# Patient Record
(demographics unavailable — no encounter records)

---

## 2024-12-16 NOTE — WORK
[Torn Ligament/Tendon/Muscle] : torn ligament, tendon or muscle [Was the competent medical cause of the injury] : was the competent medical cause of the injury [Are consistent with the injury] : are consistent with the injury [Consistent with my objective findings] : consistent with my objective findings [Severe Partial] : severe partial [Cannot return to work because ________] : cannot return to work because [unfilled] [Lifting] : lifting [15+ days] : 15+ days [Patient] : patient [I provided the services listed above] :  I provided the services listed above. [No Rx restrictions] : No Rx restrictions. [FreeTextEntry1] : fair
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
## 2024-12-16 NOTE — DATA REVIEWED
[FreeTextEntry1] : MRI left shoulder [facility] 11/1/23 Large u shaped tearing of the supraspinatus and infraspinatus with 4 cm of retraction
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
## 2024-12-16 NOTE — PHYSICAL EXAM
[de-identified] : Constitutional: The general appearance of the patient is well developed, well nourished, no deformities and well groomed. Normal   Gait: Gait and function is as follows: normal gait.   Skin: Head and neck visualized skin is normal. Left upper extremity visualized skin is normal. Right upper extremity visualized skin is normal. Thoracic Skin of the thoracic spine shows visualized skin is normal.   Cardiovascular: palpable radial pulse bilaterally, good capillary refill in digits of the bilateral upper extremities and no temperature or color changes in the bilateral upper extremities.   Lymphatic: Normal Palpation of lymph nodes in the cervical.   Neurologic: fine motor control in the bilateral upper extremities is intact. Deep Tendon Reflexes in Upper and Lower Extremities Negative Gauthier's in the bilateral upper extremities. The patient is oriented to time, place and person. Sensation to light touch intact in the bilateral upper extremities. Mood and Affect is normal.   Right Shoulder: Inspection of the shoulder/upper arm is as follows: There is a full, pain-free, stable range of motion of the shoulder with normal strength and no tenderness to palpation.      Left Shoulder: Inspection of the shoulder/upper arm is as follows: Stable shoulder. Palpation of the shoulder/upper arm is as follows: There is tenderness at the AC joint, proximal biceps tendon and supraspinatus tendon. Range of motion of the shoulder is as follows: Pain with internal rotation, external rotation, abduction and forward flexion. Strength of the shoulder is as follows: Supraspinatus 4/5. External Rotation 4/5. Internal Rotation 4/5. Infraspinatus 5/5 4/5. Deltoid 5/5 Ligament Stability and Special Tests of the shoulder is as follows: Neer test is positive. Barbosa' test is positive. Speed's test is positive.  Neck: Inspection / Palpation of the cervical spine is as follows: There is a mild restricted range of motion of the cervical spine. Increase tone and mild tenderness to palpation. Stable ROM of cervical spine.   Back, including spine: Inspection / Palpation of the thoracic/lumbar spine is as follows: There is a full, pain free, stable range of motion of the thoracic spine with a normal tone and not tenderness to palpation..
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
## 2025-02-10 NOTE — WORK
[Torn Ligament/Tendon/Muscle] : torn ligament, tendon or muscle [Was the competent medical cause of the injury] : was the competent medical cause of the injury [Are consistent with the injury] : are consistent with the injury [Consistent with my objective findings] : consistent with my objective findings [Severe Partial] : severe partial [Cannot return to work because ________] : cannot return to work because [unfilled] [Lifting] : lifting [15+ days] : 15+ days [Patient] : patient [No Rx restrictions] : No Rx restrictions. [I provided the services listed above] :  I provided the services listed above. [FreeTextEntry1] : fair

## 2025-02-10 NOTE — PHYSICAL EXAM
[de-identified] : Constitutional: The general appearance of the patient is well developed, well nourished, no deformities and well groomed. Normal   Gait: Gait and function is as follows: normal gait.   Skin: Head and neck visualized skin is normal. Left upper extremity visualized skin is normal. Right upper extremity visualized skin is normal. Thoracic Skin of the thoracic spine shows visualized skin is normal.   Cardiovascular: palpable radial pulse bilaterally, good capillary refill in digits of the bilateral upper extremities and no temperature or color changes in the bilateral upper extremities.   Lymphatic: Normal Palpation of lymph nodes in the cervical.   Neurologic: fine motor control in the bilateral upper extremities is intact. Deep Tendon Reflexes in Upper and Lower Extremities Negative Gauthier's in the bilateral upper extremities. The patient is oriented to time, place and person. Sensation to light touch intact in the bilateral upper extremities. Mood and Affect is normal.   Right Shoulder: Inspection of the shoulder/upper arm is as follows: There is a full, pain-free, stable range of motion of the shoulder with normal strength and no tenderness to palpation.      Left Shoulder: Inspection of the shoulder/upper arm is as follows: Stable shoulder. Palpation of the shoulder/upper arm is as follows: There is tenderness at the AC joint, proximal biceps tendon and supraspinatus tendon. Range of motion of the shoulder is as follows: Pain with internal rotation, external rotation, abduction and forward flexion. Strength of the shoulder is as follows: Supraspinatus 4/5. External Rotation 4/5. Internal Rotation 4/5. Infraspinatus 5/5 4/5. Deltoid 5/5 Ligament Stability and Special Tests of the shoulder is as follows: Neer test is positive. Barbosa' test is positive. Speed's test is positive.  Neck: Inspection / Palpation of the cervical spine is as follows: There is a mild restricted range of motion of the cervical spine. Increase tone and mild tenderness to palpation. Stable ROM of cervical spine.   Back, including spine: Inspection / Palpation of the thoracic/lumbar spine is as follows: There is a full, pain free, stable range of motion of the thoracic spine with a normal tone and not tenderness to palpation..

## 2025-02-10 NOTE — PHYSICAL EXAM
[de-identified] : Constitutional: The general appearance of the patient is well developed, well nourished, no deformities and well groomed. Normal   Gait: Gait and function is as follows: normal gait.   Skin: Head and neck visualized skin is normal. Left upper extremity visualized skin is normal. Right upper extremity visualized skin is normal. Thoracic Skin of the thoracic spine shows visualized skin is normal.   Cardiovascular: palpable radial pulse bilaterally, good capillary refill in digits of the bilateral upper extremities and no temperature or color changes in the bilateral upper extremities.   Lymphatic: Normal Palpation of lymph nodes in the cervical.   Neurologic: fine motor control in the bilateral upper extremities is intact. Deep Tendon Reflexes in Upper and Lower Extremities Negative Gauthier's in the bilateral upper extremities. The patient is oriented to time, place and person. Sensation to light touch intact in the bilateral upper extremities. Mood and Affect is normal.   Right Shoulder: Inspection of the shoulder/upper arm is as follows: There is a full, pain-free, stable range of motion of the shoulder with normal strength and no tenderness to palpation.      Left Shoulder: Inspection of the shoulder/upper arm is as follows: Stable shoulder. Palpation of the shoulder/upper arm is as follows: There is tenderness at the AC joint, proximal biceps tendon and supraspinatus tendon. Range of motion of the shoulder is as follows: Pain with internal rotation, external rotation, abduction and forward flexion. Strength of the shoulder is as follows: Supraspinatus 4/5. External Rotation 4/5. Internal Rotation 4/5. Infraspinatus 5/5 4/5. Deltoid 5/5 Ligament Stability and Special Tests of the shoulder is as follows: Neer test is positive. Barbosa' test is positive. Speed's test is positive.  Neck: Inspection / Palpation of the cervical spine is as follows: There is a mild restricted range of motion of the cervical spine. Increase tone and mild tenderness to palpation. Stable ROM of cervical spine.   Back, including spine: Inspection / Palpation of the thoracic/lumbar spine is as follows: There is a full, pain free, stable range of motion of the thoracic spine with a normal tone and not tenderness to palpation..
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
## 2025-03-10 NOTE — WORK
[Torn Ligament/Tendon/Muscle] : torn ligament, tendon or muscle [Was the competent medical cause of the injury] : was the competent medical cause of the injury [Are consistent with the injury] : are consistent with the injury [Consistent with my objective findings] : consistent with my objective findings [Cannot return to work because ________] : cannot return to work because [unfilled] [Lifting] : lifting [Patient] : patient [No Rx restrictions] : No Rx restrictions. [I provided the services listed above] :  I provided the services listed above. [Total (100%)] : total (100%) [N/A] : : Not Applicable [FreeTextEntry1] : fair

## 2025-03-10 NOTE — PHYSICAL EXAM
[Left] : left shoulder [Components well fixed, in good position] : Components well fixed, in good position [No bony abnormalities] : No bony abnormalities [de-identified] : Constitutional: The general appearance of the patient is well developed, well nourished, no deformities and well groomed. Normal   Gait: Gait and function is as follows: normal gait.   Skin: Head and neck visualized skin is normal. Left upper extremity visualized skin is normal. Right upper extremity visualized skin is normal. Thoracic Skin of the thoracic spine shows visualized skin is normal.   Cardiovascular: palpable radial pulse bilaterally, good capillary refill in digits of the bilateral upper extremities and no temperature or color changes in the bilateral upper extremities.   Lymphatic: Normal Palpation of lymph nodes in the cervical.   Neurologic: fine motor control in the bilateral upper extremities is intact. Deep Tendon Reflexes in Upper and Lower Extremities Negative Gauthier's in the bilateral upper extremities. The patient is oriented to time, place and person. Sensation to light touch intact in the bilateral upper extremities. Mood and Affect is normal.   Right Shoulder: Inspection of the shoulder/upper arm is as follows: There is a full, pain-free, stable range of motion of the shoulder with normal strength and no tenderness to palpation.     Left Shoulder:  Inspection of the shoulder/upper arm is as follows: Stable shoulder. Palpation of the shoulder/upper arm is as follows: There is mild diffuse tenderness . Range of motion of the shoulder is as follows: Limited secondary to immobilization/surgery. Strength of the shoulder is as follows:  Deltoid 5/5 Ligament Stability and Special Tests of the shoulder is as follows: Stable shoulder Incisions benign, no erythema/ swelling.  Neck: Inspection / Palpation of the cervical spine is as follows: There is a mild restricted range of motion of the cervical spine. Increase tone and mild tenderness to palpation. Stable ROM of cervical spine.   Back, including spine: Inspection / Palpation of the thoracic/lumbar spine is as follows: There is a full, pain free, stable range of motion of the thoracic spine with a normal tone and not tenderness to palpation..

## 2025-03-10 NOTE — DISCUSSION/SUMMARY
[de-identified] : This is a 72 yo female 6 days s/p left reverse total shoulder replacement, and biceps tenodesis. patient is doing well, pain is controlled.  Incision is healing well with no evidence of infection or hematoma x-rays today demonstrate excellent overall alignment. Patient was provided PT prescription according to reverse protocol. They will start in 3 weeks. Continue with sling immobilizer x 2 weeks Continue elbow, wrist, hand motion. Prescription for PT provided today  Patient will follow up in 1 month w/ repeat x-rays  Patient is currently 100% temporary disabled as result of surgery.   (1) We discussed a comprehensive treatment plans that included a prescription management plan involving the use of prescription strength medications to include Ibuprofen 600-800 mg TID, versus 500-650 mg Tylenol. We also discussed prescribing topical diclofenac (Voltaren gel) as well as once daily MeloFxicam 15 mg. (2) The patient has More Than One chronic injuries/illnesses as outlined, discussed, and documented by ICD 10 codes listed, as well as the HPI and Plan section. There is a moderate risk of morbidity with further treatment, especially from use of prescription strength medications and possible side effects of these medications which include upset stomach and cardiac/renal issues with long term use were discussed. (3) I recommended that the patient follow-up with their medical physician to discuss any significant specific potential issues with long term use such as interactions with current medications or with exacerbation of underlying medical morbidities.    Attestation:   I, Tasia Benton, attest that this documentation has been prepared under the direction and in the presence of Provider Jose Hanna MD.  The documentation recorded by the scribe, in my presence, accurately reflects the service I personally performed, and the decisions made by me with my edits as appropriate.   Jose Hanna MD
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
## 2025-04-14 NOTE — DISCUSSION/SUMMARY
[de-identified] : This is a 74 yo female 5 weeks s/p left reverse total shoulder replacement, and biceps tenodesis. patient is doing well, pain is controlled.  Incision is healing well with no evidence of infection or hematoma I personally reviewed x-rays today which demonstrate excellent overall alignment. Demonstrated supine FF movements to improve ROM  Patient was provided PT prescription according to reverse protocol Prescription given for oxycodone 5mg  Continue elbow, wrist, hand motion.  Patient will follow up in 2 months   Patient is currently 100% temporary disabled as result of surgery.   (1) We discussed a comprehensive treatment plans that included a prescription management plan involving the use of prescription strength medications to include Ibuprofen 600-800 mg TID, versus 500-650 mg Tylenol. We also discussed prescribing topical diclofenac (Voltaren gel) as well as once daily MeloFxicam 15 mg. (2) The patient has More Than One chronic injuries/illnesses as outlined, discussed, and documented by ICD 10 codes listed, as well as the HPI and Plan section. There is a moderate risk of morbidity with further treatment, especially from use of prescription strength medications and possible side effects of these medications which include upset stomach and cardiac/renal issues with long term use were discussed. (3) I recommended that the patient follow-up with their medical physician to discuss any significant specific potential issues with long term use such as interactions with current medications or with exacerbation of underlying medical morbidities.    Attestation:   I, Tasia Benton, attest that this documentation has been prepared under the direction and in the presence of Provider Jose Hanna MD.  The documentation recorded by the scribe, in my presence, accurately reflects the service I personally performed, and the decisions made by me with my edits as appropriate.   Jose Hanna MD

## 2025-04-14 NOTE — WORK
[Torn Ligament/Tendon/Muscle] : torn ligament, tendon or muscle [Was the competent medical cause of the injury] : was the competent medical cause of the injury [Are consistent with the injury] : are consistent with the injury [Consistent with my objective findings] : consistent with my objective findings [Total (100%)] : total (100%) [Cannot return to work because ________] : cannot return to work because [unfilled] [Lifting] : lifting [N/A] : : Not Applicable [Patient] : patient [No Rx restrictions] : No Rx restrictions. [I provided the services listed above] :  I provided the services listed above. [FreeTextEntry1] : fair

## 2025-04-14 NOTE — PHYSICAL EXAM
[Left] : left shoulder [Components well fixed, in good position] : Components well fixed, in good position [No bony abnormalities] : No bony abnormalities [de-identified] : Constitutional: The general appearance of the patient is well developed, well nourished, no deformities and well groomed. Normal   Gait: Gait and function is as follows: normal gait.   Skin: Head and neck visualized skin is normal. Left upper extremity visualized skin is normal. Right upper extremity visualized skin is normal. Thoracic Skin of the thoracic spine shows visualized skin is normal.   Cardiovascular: palpable radial pulse bilaterally, good capillary refill in digits of the bilateral upper extremities and no temperature or color changes in the bilateral upper extremities.   Lymphatic: Normal Palpation of lymph nodes in the cervical.   Neurologic: fine motor control in the bilateral upper extremities is intact. Deep Tendon Reflexes in Upper and Lower Extremities Negative Gauthier's in the bilateral upper extremities. The patient is oriented to time, place and person. Sensation to light touch intact in the bilateral upper extremities. Mood and Affect is normal.   Right Shoulder: Inspection of the shoulder/upper arm is as follows: There is a full, pain-free, stable range of motion of the shoulder with normal strength and no tenderness to palpation.     Left Shoulder:  Inspection of the shoulder/upper arm is as follows: Stable shoulder. Palpation of the shoulder/upper arm is as follows: There is mild diffuse tenderness . Range of motion of the shoulder is as follows: Limited secondary to immobilization/surgery. Strength of the shoulder is as follows:  Deltoid 5/5 Ligament Stability and Special Tests of the shoulder is as follows: Stable shoulder Incisions benign, no erythema/ swelling.  Neck: Inspection / Palpation of the cervical spine is as follows: There is a mild restricted range of motion of the cervical spine. Increase tone and mild tenderness to palpation. Stable ROM of cervical spine.   Back, including spine: Inspection / Palpation of the thoracic/lumbar spine is as follows: There is a full, pain free, stable range of motion of the thoracic spine with a normal tone and not tenderness to palpation..

## 2025-06-02 NOTE — WORK
[Torn Ligament/Tendon/Muscle] : torn ligament, tendon or muscle [Was the competent medical cause of the injury] : was the competent medical cause of the injury [Are consistent with the injury] : are consistent with the injury [Consistent with my objective findings] : consistent with my objective findings [Total (100%)] : total (100%) [Cannot return to work because ________] : cannot return to work because [unfilled] [Lifting] : lifting [N/A] : : Not Applicable [Patient] : patient [No Rx restrictions] : No Rx restrictions. [I provided the services listed above] :  I provided the services listed above.

## 2025-06-02 NOTE — PHYSICAL EXAM
[Left] : left shoulder [Components well fixed, in good position] : Components well fixed, in good position [No bony abnormalities] : No bony abnormalities